# Patient Record
Sex: MALE | Race: OTHER | HISPANIC OR LATINO
[De-identification: names, ages, dates, MRNs, and addresses within clinical notes are randomized per-mention and may not be internally consistent; named-entity substitution may affect disease eponyms.]

---

## 2018-07-24 ENCOUNTER — APPOINTMENT (OUTPATIENT)
Dept: ENDOCRINOLOGY | Facility: CLINIC | Age: 63
End: 2018-07-24

## 2018-07-24 PROBLEM — Z00.00 ENCOUNTER FOR PREVENTIVE HEALTH EXAMINATION: Status: ACTIVE | Noted: 2018-07-24

## 2022-06-21 VITALS
HEIGHT: 75 IN | DIASTOLIC BLOOD PRESSURE: 94 MMHG | RESPIRATION RATE: 16 BRPM | TEMPERATURE: 98 F | WEIGHT: 250 LBS | OXYGEN SATURATION: 100 % | HEART RATE: 80 BPM | SYSTOLIC BLOOD PRESSURE: 139 MMHG

## 2022-06-21 RX ORDER — CHLORHEXIDINE GLUCONATE 213 G/1000ML
1 SOLUTION TOPICAL ONCE
Refills: 0 | Status: DISCONTINUED | OUTPATIENT
Start: 2022-06-23 | End: 2022-07-07

## 2022-06-21 NOTE — H&P ADULT - HISTORY OF PRESENT ILLNESS
Cardiologist: Dr. Clark   Pharmacy:    Escort:    COVID: 6/20 vs 6/21 @ [facility]    *Verify Meds*    65yo M w/ PMHx of HTN, HLD, DM T2, Hyperthyroidism, PUD (s/p surgery; “healed” per pt),  who presented to his cardiologist, Dr. Clark, endorsing L sided chest pain w/ and w/o exertion that is non-radiating and pressure like in nature. Episodes last ~5-10min and are associated w/ SALAZAR w/ ambulation of 5-6 blocks. Pt denies palpitations, dizziness/syncope, orthopnea, PND, diaphoresis, LE edema, N/V, fever/chills, abdominal pain. TTE (5/23/22): LVEF 60%, mild diastolic dysfunction, non significant valvular disease. Stress ECHO (6/14/22): inferior wall hypokinesis w/ peak exercise consistent w/ myocardial ischemia. In light of patient’s risk factors, CCS Class IV anginal symptoms, and abnormal Stress echocardiogram, patient now presents to [facility] for cardiac catheterization with possible intervention if clinically indicated.  Cardiologist: Dr. Clark   Pharmacy:  [facility] [place] · [phone]  Escort:  Daughter  COVID: 6/23- negative @ [facility]    65yo M w/ PMHx of HTN, HLD, DM T2, Hyperthyroidism, PUD (s/p surgery; “healed” per pt),  who presented to his cardiologist, Dr. Clark, endorsing L sided chest pain w/ and w/o exertion that is non-radiating and pressure like in nature. Episodes last ~5-10min and are associated w/ SALAZAR w/ ambulation of 5-6 blocks. Pt denies palpitations, dizziness/syncope, orthopnea, PND, diaphoresis, LE edema, N/V, fever/chills, abdominal pain. TTE (5/23/22): LVEF 60%, mild diastolic dysfunction, non significant valvular disease. Stress ECHO (6/14/22): inferior wall hypokinesis w/ peak exercise consistent w/ myocardial ischemia. In light of patient’s risk factors, CCS Class IV anginal symptoms, and abnormal Stress echocardiogram, patient now presents to [facility] for cardiac catheterization with possible intervention if clinically indicated.

## 2022-06-21 NOTE — H&P ADULT - NSHPLABSRESULTS_GEN_ALL_CORE
14.9   3.73  )-----------( 188      ( 23 Jun 2022 09:24 )             46.6       06-23    137  |  99  |  x   ----------------------------<  x   3.8   |  x   |  x           PT/INR - ( 23 Jun 2022 09:24 )   PT: 11.8 sec;   INR: 0.99          PTT - ( 23 Jun 2022 09:24 )  PTT:31.2 sec              EKG: 14.9   3.73  )-----------( 188      ( 23 Jun 2022 09:24 )             46.6       06-23    137  |  99  |  13  ----------------------------<  148<H>  3.8   |  29  |  0.84    Ca    9.4      23 Jun 2022 09:24    TPro  7.6  /  Alb  4.6  /  TBili  0.4  /  DBili  x   /  AST  22  /  ALT  20  /  AlkPhos  84  06-23      PT/INR - ( 23 Jun 2022 09:24 )   PT: 11.8 sec;   INR: 0.99          PTT - ( 23 Jun 2022 09:24 )  PTT:31.2 sec    CARDIAC MARKERS ( 23 Jun 2022 09:24 )  x     / x     / 174 U/L / x     / 3.0 ng/mL            EKG: SR with 1st degree AV block

## 2022-06-21 NOTE — H&P ADULT - NSICDXPASTSURGICALHX_GEN_ALL_CORE_FT
PAST SURGICAL HISTORY:  H/O cataract extraction b/l    S/P cholecystectomy     S/P shoulder surgery b/l

## 2022-06-21 NOTE — H&P ADULT - ASSESSMENT
ASA _____				Mallampati class: _________	            Anginal Class: _________      Sedation Plan:   [  ] None   [  ] Moderate   [  ]  Deep    [  ]  General Anesthesia   Patient Is Suitable Candidate For Sedation?     [  ] Yes   [  ] No   [  ] Not Applicable   Cath Order Entered: [  ] Yes  DAPT LOAD Ordered: [  ] Yes  [  ] No load 2/2 ________  Pre-Cath fluids Ordered: [  ] Yes  [  ] Not indicated 2/2 _________    Risks Benefits Annotation:     CARDIAC CATH: Risks & benefits of procedure and sedation and risks and benefits for the alternative therapy have been explained to the patient and/or HCP in layman’s terms including but not limited to: allergic reaction, bleeding, infection, arrhythmia, respiratory compromise, renal and vascular compromise, limb damage, MI, CVA, emergent CABG/Vascular Surgery and death. Informed consent obtained and in chart.    PERIPHERAL CATH:  Risks & benefits of procedure and sedation and risks and benefits for the alternative therapy have been explained to the patient and/or HCP in layman’s terms including but not limited to: allergic reaction, bleeding, infection, arrhythmia, respiratory compromise, renal and vascular compromise, limb damage, MI, CVA, emergent Vascular Surgery and death. Informed consent obtained and in chart.               67yo M w/ PMHx of HTN, HLD, DM T2, Hyperthyroidism, PUD (s/p surgery; “healed” per pt),  who presented to his cardiologist, Dr. Clark, endorsing L sided chest pain w/ and w/o exertion that is non-radiating and pressure like in nature. Episodes last ~5-10min and are associated w/ SALAZAR w/ ambulation of 5-6 blocks. Pt denies palpitations, dizziness/syncope, orthopnea, PND, diaphoresis, LE edema, N/V, fever/chills, abdominal pain. TTE (5/23/22): LVEF 60%, mild diastolic dysfunction, non significant valvular disease. Stress ECHO (6/14/22): inferior wall hypokinesis w/ peak exercise consistent w/ myocardial ischemia. In light of patient’s risk factors, CCS Class IV anginal symptoms, and abnormal Stress echocardiogram, patient now presents to [facility] for cardiac catheterization with possible intervention if clinically indicated.     ASA- III				Mallampati class: II	            Anginal Class: IV      Sedation Plan:   [  ] None   [ X ] Moderate   [  ]  Deep    [  ]  General Anesthesia   Patient Is Suitable Candidate For Sedation?     [ X ] Yes   [  ] No   [  ] Not Applicable   Cath Order Entered: [ X ] Yes  DAPT LOAD Ordered: [ X ] Yes  Hgb-14.6, HCT-46.6, Denies bleeding; Will load Aspirin 325mg PO X 1, Plavix 600mg PO x 1  Pre-Cath fluids Ordered: [ X ] Yes. Echo- EF-60% on 05/23/2022. BUN-13 Creatinine-0.84, Took Metformin 500mg at 6 AM as per pt,  CC bolus x 1 followed by NS @75cc per hr x 2 hrs  Consent obtained by language line ID # [id number]  Potassium repleted    Risks Benefits Annotation:     CARDIAC CATH: Risks & benefits of procedure and sedation and risks and benefits for the alternative therapy have been explained to the patient and/or HCP in layman’s terms including but not limited to: allergic reaction, bleeding, infection, arrhythmia, respiratory compromise, renal and vascular compromise, limb damage, MI, CVA, emergent CABG/Vascular Surgery and death. Informed consent obtained and in chart.             65yo M w/ PMHx of HTN, HLD, DM T2, Hyperthyroidism, PUD (s/p surgery; “healed” per pt),  who presented to his cardiologist, Dr. Clark, endorsing L sided chest pain w/ and w/o exertion that is non-radiating and pressure like in nature. Episodes last ~5-10min and are associated w/ SALAZAR w/ ambulation of 5-6 blocks. Pt denies palpitations, dizziness/syncope, orthopnea, PND, diaphoresis, LE edema, N/V, fever/chills, abdominal pain. TTE (5/23/22): LVEF 60%, mild diastolic dysfunction, non significant valvular disease. Stress ECHO (6/14/22): inferior wall hypokinesis w/ peak exercise consistent w/ myocardial ischemia. In light of patient’s risk factors, CCS Class IV anginal symptoms, and abnormal Stress echocardiogram, patient now presents to [facility] for cardiac catheterization with possible intervention if clinically indicated.     ASA- III				Mallampati class: II	            Anginal Class: IV      Sedation Plan:   [  ] None   [ X ] Moderate   [  ]  Deep    [  ]  General Anesthesia   Patient Is Suitable Candidate For Sedation?     [ X ] Yes   [  ] No   [  ] Not Applicable   Cath Order Entered: [ X ] Yes  DAPT LOAD Ordered: [ X ] Yes  Hgb-14.6, HCT-46.6, Denies bleeding; Will load Aspirin 325mg PO X 1, Plavix 600mg PO x 1  Pre-Cath fluids Ordered: [ X ] Yes. Echo- EF-60% on 05/23/2022. BUN-13 Creatinine-0.84, Took Glipizide-Metformin 2.5mg/500mg at 6 AM as per pt,  CC bolus x 1 followed by NS @75cc per hr x 2 hrs  Consent obtained by language line ID # [id number]  Potassium repleted    Risks Benefits Annotation:     CARDIAC CATH: Risks & benefits of procedure and sedation and risks and benefits for the alternative therapy have been explained to the patient and/or HCP in layman’s terms including but not limited to: allergic reaction, bleeding, infection, arrhythmia, respiratory compromise, renal and vascular compromise, limb damage, MI, CVA, emergent CABG/Vascular Surgery and death. Informed consent obtained and in chart.             67yo M w/ PMHx of HTN, HLD, DM T2, Hyperthyroidism, PUD (s/p surgery; “healed” per pt),  who presents to the cardiac cath in light of patient’s risk factors, CCS Class IV anginal symptoms, and abnormal Stress echocardiogram, with possible intervention if clinically indicated.     ASA- III				Mallampati class: II	            Anginal Class: IV      Sedation Plan:   [  ] None   [ X ] Moderate   [  ]  Deep    [  ]  General Anesthesia   Patient Is Suitable Candidate For Sedation?     [ X ] Yes   [  ] No   [  ] Not Applicable   Cath Order Entered: [ X ] Yes  DAPT LOAD Ordered: [ X ] Yes  Hgb-14.6, HCT-46.6, Denies bleeding; Will load Aspirin 325mg PO X 1, Plavix 600mg PO x 1  Pre-Cath fluids Ordered: [ X ] Yes. Echo- EF-60% on 05/23/2022. BUN-13 Creatinine-0.84, Took Glipizide-Metformin 2.5mg/500mg at 6 AM as per pt,  CC bolus x 1 followed by NS @75cc per hr x 2 hrs  Consent obtained by language line ID # [id number]  Potassium repleted    Risks Benefits Annotation:     CARDIAC CATH: Risks & benefits of procedure and sedation and risks and benefits for the alternative therapy have been explained to the patient and/or HCP in layman’s terms including but not limited to: allergic reaction, bleeding, infection, arrhythmia, respiratory compromise, renal and vascular compromise, limb damage, MI, CVA, emergent CABG/Vascular Surgery and death. Informed consent obtained and in chart.

## 2022-06-21 NOTE — H&P ADULT - CARDIOVASCULAR
regular rate and rhythm/S1 S2 present/no gallops/no rub/no murmur/no JVD regular rate and rhythm/S1 S2 present/no gallops/no rub/no murmur/no JVD/vascular

## 2022-06-21 NOTE — H&P ADULT - NSICDXPASTMEDICALHX_GEN_ALL_CORE_FT
PAST MEDICAL HISTORY:  DM (diabetes mellitus)     H/O hyperthyroidism     HLD (hyperlipidemia)     HTN (hypertension)     PUD (peptic ulcer disease)

## 2022-06-23 ENCOUNTER — OUTPATIENT (OUTPATIENT)
Dept: OUTPATIENT SERVICES | Facility: HOSPITAL | Age: 67
LOS: 1 days | Discharge: HOME | End: 2022-06-23
Payer: MEDICARE

## 2022-06-23 DIAGNOSIS — Z98.890 OTHER SPECIFIED POSTPROCEDURAL STATES: Chronic | ICD-10-CM

## 2022-06-23 DIAGNOSIS — Z98.49 CATARACT EXTRACTION STATUS, UNSPECIFIED EYE: Chronic | ICD-10-CM

## 2022-06-23 DIAGNOSIS — Z90.49 ACQUIRED ABSENCE OF OTHER SPECIFIED PARTS OF DIGESTIVE TRACT: Chronic | ICD-10-CM

## 2022-06-23 LAB
A1C WITH ESTIMATED AVERAGE GLUCOSE RESULT: 6.7 % — HIGH (ref 4–5.6)
ALBUMIN SERPL ELPH-MCNC: 4.6 G/DL — SIGNIFICANT CHANGE UP (ref 3.3–5)
ALP SERPL-CCNC: 84 U/L — SIGNIFICANT CHANGE UP (ref 40–120)
ALT FLD-CCNC: 20 U/L — SIGNIFICANT CHANGE UP (ref 10–45)
ANION GAP SERPL CALC-SCNC: 9 MMOL/L — SIGNIFICANT CHANGE UP (ref 5–17)
APTT BLD: 31.2 SEC — SIGNIFICANT CHANGE UP (ref 27.5–35.5)
AST SERPL-CCNC: 22 U/L — SIGNIFICANT CHANGE UP (ref 10–40)
BASOPHILS # BLD AUTO: 0.02 K/UL — SIGNIFICANT CHANGE UP (ref 0–0.2)
BASOPHILS NFR BLD AUTO: 0.5 % — SIGNIFICANT CHANGE UP (ref 0–2)
BILIRUB SERPL-MCNC: 0.4 MG/DL — SIGNIFICANT CHANGE UP (ref 0.2–1.2)
BUN SERPL-MCNC: 13 MG/DL — SIGNIFICANT CHANGE UP (ref 7–23)
CALCIUM SERPL-MCNC: 9.4 MG/DL — SIGNIFICANT CHANGE UP (ref 8.4–10.5)
CHLORIDE SERPL-SCNC: 99 MMOL/L — SIGNIFICANT CHANGE UP (ref 96–108)
CHOLEST SERPL-MCNC: 143 MG/DL — SIGNIFICANT CHANGE UP
CK MB CFR SERPL CALC: 3 NG/ML — SIGNIFICANT CHANGE UP (ref 0–6.7)
CK SERPL-CCNC: 174 U/L — SIGNIFICANT CHANGE UP (ref 30–200)
CO2 SERPL-SCNC: 29 MMOL/L — SIGNIFICANT CHANGE UP (ref 22–31)
CREAT SERPL-MCNC: 0.84 MG/DL — SIGNIFICANT CHANGE UP (ref 0.5–1.3)
EGFR: 96 ML/MIN/1.73M2 — SIGNIFICANT CHANGE UP
EOSINOPHIL # BLD AUTO: 0.04 K/UL — SIGNIFICANT CHANGE UP (ref 0–0.5)
EOSINOPHIL NFR BLD AUTO: 1.1 % — SIGNIFICANT CHANGE UP (ref 0–6)
ESTIMATED AVERAGE GLUCOSE: 146 MG/DL — HIGH (ref 68–114)
GLUCOSE BLDC GLUCOMTR-MCNC: 103 MG/DL — HIGH (ref 70–99)
GLUCOSE SERPL-MCNC: 148 MG/DL — HIGH (ref 70–99)
HCT VFR BLD CALC: 46.6 % — SIGNIFICANT CHANGE UP (ref 39–50)
HDLC SERPL-MCNC: 41 MG/DL — SIGNIFICANT CHANGE UP
HGB BLD-MCNC: 14.9 G/DL — SIGNIFICANT CHANGE UP (ref 13–17)
IMM GRANULOCYTES NFR BLD AUTO: 0.3 % — SIGNIFICANT CHANGE UP (ref 0–1.5)
INR BLD: 0.99 — SIGNIFICANT CHANGE UP (ref 0.88–1.16)
LIPID PNL WITH DIRECT LDL SERPL: 71 MG/DL — SIGNIFICANT CHANGE UP
LYMPHOCYTES # BLD AUTO: 1.36 K/UL — SIGNIFICANT CHANGE UP (ref 1–3.3)
LYMPHOCYTES # BLD AUTO: 36.5 % — SIGNIFICANT CHANGE UP (ref 13–44)
MCHC RBC-ENTMCNC: 26.6 PG — LOW (ref 27–34)
MCHC RBC-ENTMCNC: 32 GM/DL — SIGNIFICANT CHANGE UP (ref 32–36)
MCV RBC AUTO: 83.1 FL — SIGNIFICANT CHANGE UP (ref 80–100)
MONOCYTES # BLD AUTO: 0.4 K/UL — SIGNIFICANT CHANGE UP (ref 0–0.9)
MONOCYTES NFR BLD AUTO: 10.7 % — SIGNIFICANT CHANGE UP (ref 2–14)
NEUTROPHILS # BLD AUTO: 1.9 K/UL — SIGNIFICANT CHANGE UP (ref 1.8–7.4)
NEUTROPHILS NFR BLD AUTO: 50.9 % — SIGNIFICANT CHANGE UP (ref 43–77)
NON HDL CHOLESTEROL: 102 MG/DL — SIGNIFICANT CHANGE UP
NRBC # BLD: 0 /100 WBCS — SIGNIFICANT CHANGE UP (ref 0–0)
PLATELET # BLD AUTO: 188 K/UL — SIGNIFICANT CHANGE UP (ref 150–400)
POTASSIUM SERPL-MCNC: 3.8 MMOL/L — SIGNIFICANT CHANGE UP (ref 3.5–5.3)
POTASSIUM SERPL-SCNC: 3.8 MMOL/L — SIGNIFICANT CHANGE UP (ref 3.5–5.3)
PROT SERPL-MCNC: 7.6 G/DL — SIGNIFICANT CHANGE UP (ref 6–8.3)
PROTHROM AB SERPL-ACNC: 11.8 SEC — SIGNIFICANT CHANGE UP (ref 10.5–13.4)
RBC # BLD: 5.61 M/UL — SIGNIFICANT CHANGE UP (ref 4.2–5.8)
RBC # FLD: 14.4 % — SIGNIFICANT CHANGE UP (ref 10.3–14.5)
SODIUM SERPL-SCNC: 137 MMOL/L — SIGNIFICANT CHANGE UP (ref 135–145)
TRIGL SERPL-MCNC: 155 MG/DL — HIGH
WBC # BLD: 3.73 K/UL — LOW (ref 3.8–10.5)
WBC # FLD AUTO: 3.73 K/UL — LOW (ref 3.8–10.5)

## 2022-06-23 PROCEDURE — 80061 LIPID PANEL: CPT

## 2022-06-23 PROCEDURE — 82962 GLUCOSE BLOOD TEST: CPT

## 2022-06-23 PROCEDURE — C1887: CPT

## 2022-06-23 PROCEDURE — 93010 ELECTROCARDIOGRAM REPORT: CPT

## 2022-06-23 PROCEDURE — 93454 CORONARY ARTERY ANGIO S&I: CPT | Mod: 26

## 2022-06-23 PROCEDURE — 93005 ELECTROCARDIOGRAM TRACING: CPT

## 2022-06-23 PROCEDURE — 85610 PROTHROMBIN TIME: CPT

## 2022-06-23 PROCEDURE — 93454 CORONARY ARTERY ANGIO S&I: CPT

## 2022-06-23 PROCEDURE — 80053 COMPREHEN METABOLIC PANEL: CPT

## 2022-06-23 PROCEDURE — 99152 MOD SED SAME PHYS/QHP 5/>YRS: CPT

## 2022-06-23 PROCEDURE — C1769: CPT

## 2022-06-23 PROCEDURE — 99153 MOD SED SAME PHYS/QHP EA: CPT

## 2022-06-23 PROCEDURE — 85730 THROMBOPLASTIN TIME PARTIAL: CPT

## 2022-06-23 PROCEDURE — 85025 COMPLETE CBC W/AUTO DIFF WBC: CPT

## 2022-06-23 PROCEDURE — 82553 CREATINE MB FRACTION: CPT

## 2022-06-23 PROCEDURE — 82550 ASSAY OF CK (CPK): CPT

## 2022-06-23 PROCEDURE — 83036 HEMOGLOBIN GLYCOSYLATED A1C: CPT

## 2022-06-23 PROCEDURE — C1894: CPT

## 2022-06-23 RX ORDER — SODIUM CHLORIDE 9 MG/ML
250 INJECTION INTRAMUSCULAR; INTRAVENOUS; SUBCUTANEOUS ONCE
Refills: 0 | Status: COMPLETED | OUTPATIENT
Start: 2022-06-23 | End: 2022-06-23

## 2022-06-23 RX ORDER — SODIUM CHLORIDE 9 MG/ML
500 INJECTION INTRAMUSCULAR; INTRAVENOUS; SUBCUTANEOUS
Refills: 0 | Status: DISCONTINUED | OUTPATIENT
Start: 2022-06-23 | End: 2022-06-23

## 2022-06-23 RX ORDER — SODIUM CHLORIDE 9 MG/ML
1000 INJECTION, SOLUTION INTRAVENOUS
Refills: 0 | Status: DISCONTINUED | OUTPATIENT
Start: 2022-06-23 | End: 2022-07-07

## 2022-06-23 RX ORDER — CLOPIDOGREL BISULFATE 75 MG/1
600 TABLET, FILM COATED ORAL ONCE
Refills: 0 | Status: COMPLETED | OUTPATIENT
Start: 2022-06-23 | End: 2022-06-23

## 2022-06-23 RX ORDER — METOPROLOL TARTRATE 50 MG
1 TABLET ORAL
Qty: 0 | Refills: 0 | DISCHARGE

## 2022-06-23 RX ORDER — DEXTROSE 50 % IN WATER 50 %
25 SYRINGE (ML) INTRAVENOUS ONCE
Refills: 0 | Status: DISCONTINUED | OUTPATIENT
Start: 2022-06-23 | End: 2022-07-07

## 2022-06-23 RX ORDER — ASPIRIN/CALCIUM CARB/MAGNESIUM 324 MG
325 TABLET ORAL ONCE
Refills: 0 | Status: COMPLETED | OUTPATIENT
Start: 2022-06-23 | End: 2022-06-23

## 2022-06-23 RX ORDER — AMLODIPINE BESYLATE 2.5 MG/1
1 TABLET ORAL
Qty: 0 | Refills: 0 | DISCHARGE

## 2022-06-23 RX ORDER — SODIUM CHLORIDE 9 MG/ML
500 INJECTION INTRAMUSCULAR; INTRAVENOUS; SUBCUTANEOUS
Refills: 0 | Status: DISCONTINUED | OUTPATIENT
Start: 2022-06-23 | End: 2022-07-07

## 2022-06-23 RX ORDER — GLUCAGON INJECTION, SOLUTION 0.5 MG/.1ML
1 INJECTION, SOLUTION SUBCUTANEOUS ONCE
Refills: 0 | Status: DISCONTINUED | OUTPATIENT
Start: 2022-06-23 | End: 2022-07-07

## 2022-06-23 RX ORDER — ATORVASTATIN CALCIUM 80 MG/1
1 TABLET, FILM COATED ORAL
Qty: 30 | Refills: 2
Start: 2022-06-23 | End: 2022-09-20

## 2022-06-23 RX ORDER — ASPIRIN/CALCIUM CARB/MAGNESIUM 324 MG
1 TABLET ORAL
Qty: 30 | Refills: 3
Start: 2022-06-23 | End: 2022-10-20

## 2022-06-23 RX ORDER — GLIPIZIDE/METFORMIN HCL 2.5-500 MG
1 TABLET ORAL
Qty: 0 | Refills: 0 | DISCHARGE

## 2022-06-23 RX ORDER — METHIMAZOLE 10 MG/1
1 TABLET ORAL
Qty: 0 | Refills: 0 | DISCHARGE

## 2022-06-23 RX ORDER — ISOSORBIDE MONONITRATE 60 MG/1
1 TABLET, EXTENDED RELEASE ORAL
Qty: 0 | Refills: 0 | DISCHARGE

## 2022-06-23 RX ORDER — DEXTROSE 50 % IN WATER 50 %
12.5 SYRINGE (ML) INTRAVENOUS ONCE
Refills: 0 | Status: DISCONTINUED | OUTPATIENT
Start: 2022-06-23 | End: 2022-07-07

## 2022-06-23 RX ORDER — LISINOPRIL/HYDROCHLOROTHIAZIDE 10-12.5 MG
1 TABLET ORAL
Qty: 0 | Refills: 0 | DISCHARGE

## 2022-06-23 RX ORDER — DEXTROSE 50 % IN WATER 50 %
15 SYRINGE (ML) INTRAVENOUS ONCE
Refills: 0 | Status: DISCONTINUED | OUTPATIENT
Start: 2022-06-23 | End: 2022-07-07

## 2022-06-23 RX ORDER — POTASSIUM CHLORIDE 20 MEQ
20 PACKET (EA) ORAL ONCE
Refills: 0 | Status: COMPLETED | OUTPATIENT
Start: 2022-06-23 | End: 2022-06-23

## 2022-06-23 RX ORDER — PANTOPRAZOLE SODIUM 20 MG/1
1 TABLET, DELAYED RELEASE ORAL
Qty: 0 | Refills: 0 | DISCHARGE

## 2022-06-23 RX ORDER — INSULIN LISPRO 100/ML
VIAL (ML) SUBCUTANEOUS ONCE
Refills: 0 | Status: DISCONTINUED | OUTPATIENT
Start: 2022-06-23 | End: 2022-07-07

## 2022-06-23 RX ORDER — LISINOPRIL 2.5 MG/1
1 TABLET ORAL
Qty: 0 | Refills: 0 | DISCHARGE

## 2022-06-23 RX ADMIN — SODIUM CHLORIDE 500 MILLILITER(S): 9 INJECTION INTRAMUSCULAR; INTRAVENOUS; SUBCUTANEOUS at 10:58

## 2022-06-23 RX ADMIN — CLOPIDOGREL BISULFATE 600 MILLIGRAM(S): 75 TABLET, FILM COATED ORAL at 11:01

## 2022-06-23 RX ADMIN — Medication 20 MILLIEQUIVALENT(S): at 11:01

## 2022-06-23 RX ADMIN — SODIUM CHLORIDE 75 MILLILITER(S): 9 INJECTION INTRAMUSCULAR; INTRAVENOUS; SUBCUTANEOUS at 10:58

## 2022-06-23 RX ADMIN — Medication 325 MILLIGRAM(S): at 11:01

## 2022-06-23 NOTE — PROGRESS NOTE ADULT - SUBJECTIVE AND OBJECTIVE BOX
Interventional Cardiology PA SDA Discharge Note    Patient without complaints. Ambulated and voided without difficulties    Afebrile, VSS    Ext:    		  		        Right/        Radial :  no  hematoma,   no  bleeding, dressing; C/D/I      Pulses:    intact RAD to baseline     A/P:  67yo M w/ PMHx of HTN, HLD, DM T2, Hyperthyroidism, PUD (s/p surgery; “healed” per pt),  who presents to the cardiac cath in light of patient’s risk factors, CCS Class IV anginal symptoms, and abnormal Stress echocardiogram, with possible intervention if clinically indicated.     S/p cardiac catheterization 6/23/22: diagnostic. LM patent, LAD/LCx/RCA mild diffuse disease, no EDP/EF 60% per echo. R TR @ 4:30pm  -As d/w Dr. Clark, to initiate ASA 81mg QD and Atorvastatin 40mg QD (both sent to preferred pharmacy)      1.	Stable for discharge as per attending Dr. Clark after bed rest, pt voids, groin/wrist stable and 30 minutes of ambulation.  2.	Follow-up with PMD/Cardiologist Eduardo in 1-2 weeks  3.	Discharged forms signed and copies in chart

## 2022-06-24 DIAGNOSIS — I20.0 UNSTABLE ANGINA: ICD-10-CM

## 2022-06-24 DIAGNOSIS — R94.39 ABNORMAL RESULT OF OTHER CARDIOVASCULAR FUNCTION STUDY: ICD-10-CM

## 2024-10-04 PROBLEM — Z86.39 PERSONAL HISTORY OF OTHER ENDOCRINE, NUTRITIONAL AND METABOLIC DISEASE: Chronic | Status: ACTIVE | Noted: 2022-06-21

## 2024-10-04 PROBLEM — K27.9 PEPTIC ULCER, SITE UNSPECIFIED, UNSPECIFIED AS ACUTE OR CHRONIC, WITHOUT HEMORRHAGE OR PERFORATION: Chronic | Status: ACTIVE | Noted: 2022-06-21

## 2024-10-04 PROBLEM — E78.5 HYPERLIPIDEMIA, UNSPECIFIED: Chronic | Status: ACTIVE | Noted: 2022-06-21

## 2024-10-04 PROBLEM — E11.9 TYPE 2 DIABETES MELLITUS WITHOUT COMPLICATIONS: Chronic | Status: ACTIVE | Noted: 2022-06-21

## 2024-10-04 PROBLEM — I10 ESSENTIAL (PRIMARY) HYPERTENSION: Chronic | Status: ACTIVE | Noted: 2022-06-21

## 2024-10-09 VITALS
HEIGHT: 75 IN | RESPIRATION RATE: 16 BRPM | WEIGHT: 244.05 LBS | HEART RATE: 70 BPM | DIASTOLIC BLOOD PRESSURE: 77 MMHG | SYSTOLIC BLOOD PRESSURE: 131 MMHG | OXYGEN SATURATION: 96 %

## 2024-10-09 NOTE — H&P ADULT - ASSESSMENT
70 yo Taiwanese Speaking M, with a PMH of HTN, HLD, DM, hyperthyroidism, PUD s/p ?embolization (required 1U PRBC) who presented to outpatient cardiologist, Dr. Mendoza, with complaints increased SALAZAR when ambulating >3 blocks. Endorses brief palpitations and episodes of dizziness. Exercise Stress Test 08/12/24: Normal stress echo w/ normal LV wall motion. NST per MD note LVEF 63% no wall motion abnormalities, large area of moderate inferior wall ischemia as well as a moderate size area of mild anterior wall ischemia. In light of pts risk factors, CCS class II anginal symptoms and abnormal NST, pt now presents to [facility] for recommended cardiac catheterization with possible intervention if clinically indicated.    -H/H = 14.2/43.8. Pt denies BRBPR, hematuria, hematochezia, melena. Pt states he last took ASA 1-2 years ago and tolerated it well but does not continuously take it. D/w IC fellow (Dr. Hendrickson), given no bleeding sx and ulcer treated, ok to load with ASA 325mg x 1 and Plavix 600mg x1 pre cath (tolerated it before last cath as well).  -Cr = _____. EF normal. Euvolemic on exam. IVF with 250cc bolus x1 followed by NS @ 75cc/hr started pre procedure per protocol  -Type of sedation: moderate  -Candidate for sedation: yes     Risks & benefits of procedure and alternative therapy have been explained to the patient including but not limited to: allergic reaction, bleeding w/possible need for blood transfusion, infection, renal and vascular compromise, limb damage, arrhythmia, stroke, vessel dissection/perforation, Myocardial infarction, emergent CABG. Informed consent obtained and in chart.  70 yo Somali Speaking M, with a PMH of HTN, HLD, DM, hyperthyroidism, PUD s/p ?embolization (required 1U PRBC) who presented to outpatient cardiologist, Dr. Mendoza, with complaints increased SALAZAR when ambulating >3 blocks. Endorses brief palpitations and episodes of dizziness. Exercise Stress Test 08/12/24: Normal stress echo w/ normal LV wall motion. NST per MD note LVEF 63% no wall motion abnormalities, large area of moderate inferior wall ischemia as well as a moderate size area of mild anterior wall ischemia. In light of pts risk factors, CCS class II anginal symptoms and abnormal NST, pt now presents to [facility] for recommended cardiac catheterization with possible intervention if clinically indicated.    -H/H = 14.2/43.8. Pt denies BRBPR, hematuria, hematochezia, melena. Pt states he last took ASA 1-2 years ago and tolerated it well but does not continuously take it. D/w IC fellow (Dr. Hendrickson), given no bleeding sx and ulcer treated, ok to load with ASA 325mg x 1 and Plavix 600mg x1 pre cath (tolerated it before last cath as well).  -Cr = 0.83. EF normal. Euvolemic on exam. IVF with 250cc bolus x1 followed by NS @ 75cc/hr started pre procedure per protocol  -Type of sedation: moderate  -Candidate for sedation: yes     Risks & benefits of procedure and alternative therapy have been explained to the patient including but not limited to: allergic reaction, bleeding w/possible need for blood transfusion, infection, renal and vascular compromise, limb damage, arrhythmia, stroke, vessel dissection/perforation, Myocardial infarction, emergent CABG. Informed consent obtained and in chart.

## 2024-10-09 NOTE — H&P ADULT - NSHPROSALLOTHERNEGRD_GEN_ALL_CORE
Please make an appointment to follow up with Your Primary Care Provider in 1-2 days for further evaluation and recommendations.  If your symptoms significantly worsen please come back to the emergency department.    
All other review of systems negative, except as noted in HPI

## 2024-10-09 NOTE — H&P ADULT - HISTORY OF PRESENT ILLNESS
Cardiologist: Dr. Clark   Pharmacy:   Escort:       69F PMHx HTN, HLD, DM presented to outpatient cardiologist, Dr. Mendoza, with complaints increased SALAZAR when ambulating >3 blocks. Endorses brief palpitations and episodes of dizziness. Patient denies CP, SOB, palpitations, lightheadedness, syncope, LOC, abd pain, N/V/D, bleeding, hematuria, hematochezia, BRBPR, LE edema, fever, chills. cough, or LE edema. Exercise Stress Test 08/12/24: Normal stress echo w/ normal LV wall motion. NST per MD note LVEF 63% no wall motion abnormalities, large area of moderate inferior wall ischemia as well as a moderate size area of mild anterior wall ischemia        In light of pts risk factors, CCS class __ anginal symptoms and abnormal ____, pt now presents to [facility] for recommended cardiac catheterization with possible intervention if clinically indicated.   Cardiologist: Dr. Clark   Pharmacy:   Escort:       69F PMHx HTN, HLD, DM, hyperthyroidism, PUD,  presented to outpatient cardiologist, Dr. Mendoza, with complaints increased SALAZAR when ambulating >3 blocks. Endorses brief palpitations and episodes of dizziness. Patient denies CP, SOB, palpitations, lightheadedness, syncope, LOC, abd pain, N/V/D, bleeding, hematuria, hematochezia, BRBPR, LE edema, fever, chills. cough, or LE edema. Exercise Stress Test 08/12/24: Normal stress echo w/ normal LV wall motion. NST per MD note LVEF 63% no wall motion abnormalities, large area of moderate inferior wall ischemia as well as a moderate size area of mild anterior wall ischemia .     Previous Cardiac Studies   [facility] 6/23/22: diagnostic. LM patent, LAD/LCx/RCA mild diffuse disease, no EDP/EF 60% per echo       In light of pts risk factors, CCS class __ anginal symptoms and abnormal ____, pt now presents to [facility] for recommended cardiac catheterization with possible intervention if clinically indicated.   Cardiologist: Dr. Clark   Pharmacy: [facility]  Escort: Daughter    70 yo Mohawk Speaking M, with a PMH of HTN, HLD, DM, hyperthyroidism, PUD s/p ?embolization (required 1U PRBC) who presented to outpatient cardiologist, Dr. Mendoza, with complaints increased SALAZAR when ambulating >3 blocks. Endorses brief palpitations and episodes of dizziness. Patient denies CP, lightheadedness, syncope, LOC,bleeding, hematuria, hematochezia, BRBPR, LE edema, LE edema. Exercise Stress Test 08/12/24: Normal stress echo w/ normal LV wall motion. NST per MD note LVEF 63% no wall motion abnormalities, large area of moderate inferior wall ischemia as well as a moderate size area of mild anterior wall ischemia. In light of pts risk factors, CCS class II anginal symptoms and abnormal NST, pt now presents to [facility] for recommended cardiac catheterization with possible intervention if clinically indicated.    Previous Cardiac Studies   [facility] 6/23/22: diagnostic. LM patent, LAD/LCx/RCA mild diffuse disease, no EDP/EF 60% per echo

## 2024-10-10 ENCOUNTER — OUTPATIENT (OUTPATIENT)
Dept: OUTPATIENT SERVICES | Facility: HOSPITAL | Age: 69
LOS: 1 days | Discharge: HOME | End: 2024-10-10
Payer: MEDICARE

## 2024-10-10 DIAGNOSIS — Z98.49 CATARACT EXTRACTION STATUS, UNSPECIFIED EYE: Chronic | ICD-10-CM

## 2024-10-10 DIAGNOSIS — Z98.890 OTHER SPECIFIED POSTPROCEDURAL STATES: Chronic | ICD-10-CM

## 2024-10-10 DIAGNOSIS — Z90.49 ACQUIRED ABSENCE OF OTHER SPECIFIED PARTS OF DIGESTIVE TRACT: Chronic | ICD-10-CM

## 2024-10-10 LAB
A1C WITH ESTIMATED AVERAGE GLUCOSE RESULT: 6.6 % — HIGH (ref 4–5.6)
ALBUMIN SERPL ELPH-MCNC: 4.2 G/DL — SIGNIFICANT CHANGE UP (ref 3.3–5)
ALP SERPL-CCNC: 86 U/L — SIGNIFICANT CHANGE UP (ref 40–120)
ALT FLD-CCNC: 20 U/L — SIGNIFICANT CHANGE UP (ref 10–45)
ANION GAP SERPL CALC-SCNC: 9 MMOL/L — SIGNIFICANT CHANGE UP (ref 5–17)
APTT BLD: 31.4 SEC — SIGNIFICANT CHANGE UP (ref 24.5–35.6)
AST SERPL-CCNC: 19 U/L — SIGNIFICANT CHANGE UP (ref 10–40)
BASOPHILS # BLD AUTO: 0.01 K/UL — SIGNIFICANT CHANGE UP (ref 0–0.2)
BASOPHILS NFR BLD AUTO: 0.4 % — SIGNIFICANT CHANGE UP (ref 0–2)
BILIRUB SERPL-MCNC: 0.7 MG/DL — SIGNIFICANT CHANGE UP (ref 0.2–1.2)
BUN SERPL-MCNC: 16 MG/DL — SIGNIFICANT CHANGE UP (ref 7–23)
CALCIUM SERPL-MCNC: 9.2 MG/DL — SIGNIFICANT CHANGE UP (ref 8.4–10.5)
CHLORIDE SERPL-SCNC: 103 MMOL/L — SIGNIFICANT CHANGE UP (ref 96–108)
CHOLEST SERPL-MCNC: 93 MG/DL — SIGNIFICANT CHANGE UP
CK MB CFR SERPL CALC: 3.1 NG/ML — SIGNIFICANT CHANGE UP (ref 0–6.7)
CK SERPL-CCNC: 200 U/L — SIGNIFICANT CHANGE UP (ref 30–200)
CO2 SERPL-SCNC: 25 MMOL/L — SIGNIFICANT CHANGE UP (ref 22–31)
CREAT SERPL-MCNC: 0.83 MG/DL — SIGNIFICANT CHANGE UP (ref 0.5–1.3)
EGFR: 94 ML/MIN/1.73M2 — SIGNIFICANT CHANGE UP
EOSINOPHIL # BLD AUTO: 0.03 K/UL — SIGNIFICANT CHANGE UP (ref 0–0.5)
EOSINOPHIL NFR BLD AUTO: 1.2 % — SIGNIFICANT CHANGE UP (ref 0–6)
ESTIMATED AVERAGE GLUCOSE: 143 MG/DL — HIGH (ref 68–114)
GLUCOSE BLDC GLUCOMTR-MCNC: 100 MG/DL — HIGH (ref 70–99)
GLUCOSE BLDC GLUCOMTR-MCNC: 124 MG/DL — HIGH (ref 70–99)
GLUCOSE SERPL-MCNC: 128 MG/DL — HIGH (ref 70–99)
HCT VFR BLD CALC: 43.8 % — SIGNIFICANT CHANGE UP (ref 39–50)
HDLC SERPL-MCNC: 40 MG/DL — LOW
HGB BLD-MCNC: 14.2 G/DL — SIGNIFICANT CHANGE UP (ref 13–17)
IMM GRANULOCYTES NFR BLD AUTO: 0 % — SIGNIFICANT CHANGE UP (ref 0–0.9)
INR BLD: 0.99 — SIGNIFICANT CHANGE UP (ref 0.85–1.16)
LIPID PNL WITH DIRECT LDL SERPL: 38 MG/DL — SIGNIFICANT CHANGE UP
LYMPHOCYTES # BLD AUTO: 1.01 K/UL — SIGNIFICANT CHANGE UP (ref 1–3.3)
LYMPHOCYTES # BLD AUTO: 39.5 % — SIGNIFICANT CHANGE UP (ref 13–44)
MCHC RBC-ENTMCNC: 26.6 PG — LOW (ref 27–34)
MCHC RBC-ENTMCNC: 32.4 GM/DL — SIGNIFICANT CHANGE UP (ref 32–36)
MCV RBC AUTO: 82 FL — SIGNIFICANT CHANGE UP (ref 80–100)
MONOCYTES # BLD AUTO: 0.24 K/UL — SIGNIFICANT CHANGE UP (ref 0–0.9)
MONOCYTES NFR BLD AUTO: 9.4 % — SIGNIFICANT CHANGE UP (ref 2–14)
NEUTROPHILS # BLD AUTO: 1.27 K/UL — LOW (ref 1.8–7.4)
NEUTROPHILS NFR BLD AUTO: 49.5 % — SIGNIFICANT CHANGE UP (ref 43–77)
NON HDL CHOLESTEROL: 53 MG/DL — SIGNIFICANT CHANGE UP
NRBC # BLD: 0 /100 WBCS — SIGNIFICANT CHANGE UP (ref 0–0)
PLATELET # BLD AUTO: 158 K/UL — SIGNIFICANT CHANGE UP (ref 150–400)
POTASSIUM SERPL-MCNC: 4 MMOL/L — SIGNIFICANT CHANGE UP (ref 3.5–5.3)
POTASSIUM SERPL-SCNC: 4 MMOL/L — SIGNIFICANT CHANGE UP (ref 3.5–5.3)
PROT SERPL-MCNC: 7.3 G/DL — SIGNIFICANT CHANGE UP (ref 6–8.3)
PROTHROM AB SERPL-ACNC: 11.4 SEC — SIGNIFICANT CHANGE UP (ref 9.9–13.4)
RBC # BLD: 5.34 M/UL — SIGNIFICANT CHANGE UP (ref 4.2–5.8)
RBC # FLD: 13.9 % — SIGNIFICANT CHANGE UP (ref 10.3–14.5)
SODIUM SERPL-SCNC: 137 MMOL/L — SIGNIFICANT CHANGE UP (ref 135–145)
TRIGL SERPL-MCNC: 67 MG/DL — SIGNIFICANT CHANGE UP
WBC # BLD: 2.56 K/UL — LOW (ref 3.8–10.5)
WBC # FLD AUTO: 2.56 K/UL — LOW (ref 3.8–10.5)

## 2024-10-10 PROCEDURE — C1894: CPT

## 2024-10-10 PROCEDURE — 82550 ASSAY OF CK (CPK): CPT

## 2024-10-10 PROCEDURE — 83036 HEMOGLOBIN GLYCOSYLATED A1C: CPT

## 2024-10-10 PROCEDURE — 82553 CREATINE MB FRACTION: CPT

## 2024-10-10 PROCEDURE — 36415 COLL VENOUS BLD VENIPUNCTURE: CPT

## 2024-10-10 PROCEDURE — 85730 THROMBOPLASTIN TIME PARTIAL: CPT

## 2024-10-10 PROCEDURE — 85025 COMPLETE CBC W/AUTO DIFF WBC: CPT

## 2024-10-10 PROCEDURE — 80053 COMPREHEN METABOLIC PANEL: CPT

## 2024-10-10 PROCEDURE — 80061 LIPID PANEL: CPT

## 2024-10-10 PROCEDURE — 93454 CORONARY ARTERY ANGIO S&I: CPT | Mod: 26

## 2024-10-10 PROCEDURE — 85610 PROTHROMBIN TIME: CPT

## 2024-10-10 PROCEDURE — C1887: CPT

## 2024-10-10 PROCEDURE — C1769: CPT

## 2024-10-10 PROCEDURE — 93454 CORONARY ARTERY ANGIO S&I: CPT

## 2024-10-10 PROCEDURE — 99152 MOD SED SAME PHYS/QHP 5/>YRS: CPT

## 2024-10-10 PROCEDURE — 93010 ELECTROCARDIOGRAM REPORT: CPT | Mod: 77

## 2024-10-10 PROCEDURE — 82962 GLUCOSE BLOOD TEST: CPT

## 2024-10-10 PROCEDURE — 93005 ELECTROCARDIOGRAM TRACING: CPT

## 2024-10-10 PROCEDURE — 93010 ELECTROCARDIOGRAM REPORT: CPT

## 2024-10-10 RX ORDER — SODIUM CHLORIDE 0.9 % (FLUSH) 0.9 %
250 SYRINGE (ML) INJECTION ONCE
Refills: 0 | Status: COMPLETED | OUTPATIENT
Start: 2024-10-10 | End: 2024-10-10

## 2024-10-10 RX ORDER — LISINOPRIL/HYDROCHLOROTHIAZIDE 10-12.5 MG
1 TABLET ORAL
Refills: 0 | DISCHARGE

## 2024-10-10 RX ORDER — SODIUM CHLORIDE 0.9 % (FLUSH) 0.9 %
1000 SYRINGE (ML) INJECTION
Refills: 0 | Status: ACTIVE | OUTPATIENT
Start: 2024-10-10 | End: 2024-10-10

## 2024-10-10 RX ORDER — ATORVASTATIN CALCIUM 10 MG/1
1 TABLET, FILM COATED ORAL
Refills: 0 | DISCHARGE

## 2024-10-10 RX ORDER — METOPROLOL TARTRATE 50 MG
1 TABLET ORAL
Refills: 0 | DISCHARGE

## 2024-10-10 RX ORDER — ISOSORBIDE DINITRATE 10 MG
1 TABLET ORAL
Refills: 0 | DISCHARGE

## 2024-10-10 RX ORDER — METHIMAZOLE 5 MG/1
1 TABLET ORAL
Refills: 0 | DISCHARGE

## 2024-10-10 RX ORDER — CHLORHEXIDINE GLUCONATE ORAL RINSE 1.2 MG/ML
1 SOLUTION DENTAL ONCE
Refills: 0 | Status: ACTIVE | OUTPATIENT
Start: 2024-10-10

## 2024-10-10 RX ORDER — ASPIRIN 325 MG
325 TABLET ORAL ONCE
Refills: 0 | Status: COMPLETED | OUTPATIENT
Start: 2024-10-10 | End: 2024-10-10

## 2024-10-10 RX ORDER — GLIPIZIDE AND METFORMIN HYDROCHLORIDE 2.5; 5 MG/1; MG/1
1 TABLET, FILM COATED ORAL
Refills: 0 | DISCHARGE

## 2024-10-10 RX ADMIN — Medication 75 MILLILITER(S): at 12:45

## 2024-10-10 RX ADMIN — Medication 325 MILLIGRAM(S): at 12:45

## 2024-10-10 RX ADMIN — Medication 600 MILLIGRAM(S): at 12:45

## 2024-10-10 RX ADMIN — Medication 240 MILLILITER(S): at 16:37

## 2024-10-10 RX ADMIN — Medication 500 MILLILITER(S): at 12:46

## 2024-10-10 NOTE — PROGRESS NOTE ADULT - SUBJECTIVE AND OBJECTIVE BOX
Interventional Cardiology PA SDA Discharge Note    Patient without complaints. Ambulated and voided without difficulties    Afebrile, VSS    Ext: Right Radial :  No hematoma, no bleeding, dressing; C/D/I    Pulses:    intact RAD to baseline     A/P:  70 yo Ukrainian Speaking M, with a PMH of HTN, HLD, DM, hyperthyroidism, PUD s/p ?embolization (required 1U PRBC) who presented to outpatient cardiologist, Dr. Mendoza, with complaints increased SALAZAR when ambulating >3 blocks. Endorses brief palpitations and episodes of dizziness. Patient denies CP, lightheadedness, syncope, LOC,bleeding, hematuria, hematochezia, BRBPR, LE edema, LE edema. Exercise Stress Test 08/12/24: Normal stress echo w/ normal LV wall motion. NST per MD note LVEF 63% no wall motion abnormalities, large area of moderate inferior wall ischemia as well as a moderate size area of mild anterior wall ischemia. In light of pts risk factors, CCS class II anginal symptoms and abnormal NST, pt now presents to [facility] for recommended cardiac catheterization with possible intervention if clinically indicated.    Patient is now s/p cardiac cath 10/10/24: LM: normal, LCx/ RCA/ LAD: minimal irregularities; no edp, EF normal as per echo                 1.	Stable for discharge as per attending  _________ after bed rest, pt voids, groin/wrist stable and 30 minutes of ambulation.  2.	Follow-up with PMD/Cardiologist ___________ in 1-2 weeks  3.	Discharged forms signed and copies in chart

## 2024-10-16 DIAGNOSIS — Z82.49 FAMILY HISTORY OF ISCHEMIC HEART DISEASE AND OTHER DISEASES OF THE CIRCULATORY SYSTEM: ICD-10-CM

## 2024-10-16 DIAGNOSIS — R94.39 ABNORMAL RESULT OF OTHER CARDIOVASCULAR FUNCTION STUDY: ICD-10-CM

## 2024-10-16 DIAGNOSIS — I25.110 ATHEROSCLEROTIC HEART DISEASE OF NATIVE CORONARY ARTERY WITH UNSTABLE ANGINA PECTORIS: ICD-10-CM
